# Patient Record
Sex: FEMALE | Race: WHITE | NOT HISPANIC OR LATINO | Employment: FULL TIME | URBAN - METROPOLITAN AREA
[De-identification: names, ages, dates, MRNs, and addresses within clinical notes are randomized per-mention and may not be internally consistent; named-entity substitution may affect disease eponyms.]

---

## 2017-02-06 ENCOUNTER — OFFICE VISIT (OUTPATIENT)
Dept: FAMILY MEDICINE | Facility: CLINIC | Age: 32
End: 2017-02-06
Payer: COMMERCIAL

## 2017-02-06 VITALS
OXYGEN SATURATION: 98 % | BODY MASS INDEX: 23.06 KG/M2 | SYSTOLIC BLOOD PRESSURE: 128 MMHG | DIASTOLIC BLOOD PRESSURE: 85 MMHG | TEMPERATURE: 97.5 F | HEART RATE: 75 BPM | WEIGHT: 138.4 LBS | HEIGHT: 65 IN

## 2017-02-06 DIAGNOSIS — F40.01 FEAR OF TRAVEL WITH PANIC ATTACKS: Primary | ICD-10-CM

## 2017-02-06 PROCEDURE — 99213 OFFICE O/P EST LOW 20 MIN: CPT | Performed by: NURSE PRACTITIONER

## 2017-02-06 RX ORDER — LORAZEPAM 0.5 MG/1
0.25-0.5 TABLET ORAL EVERY 8 HOURS PRN
Qty: 8 TABLET | Refills: 0 | Status: SHIPPED | OUTPATIENT
Start: 2017-02-06

## 2017-02-06 NOTE — MR AVS SNAPSHOT
"              After Visit Summary   2/6/2017    Teresa Lozano    MRN: [id number]           Patient Information     Date Of Birth          1985        Visit Information        Provider Department      2/6/2017 3:00 PM Kayli Blanco APRN CNP [facility] [place]        Today's Diagnoses     Fear of travel with panic attacks    -  1        Follow-ups after your visit        Who to contact     If you have questions or need follow up information about today's clinic visit or your schedule please contact [facility] [place] directly at [phone].  Normal or non-critical lab and imaging results will be communicated to you by [employer], letter or phone within 4 business days after the clinic has received the results. If you do not hear from us within 7 days, please contact the clinic through [employer] or phone. If you have a critical or abnormal lab result, we will notify you by phone as soon as possible.  Submit refill requests through [employer] or call your pharmacy and they will forward the refill request to us. Please allow 3 business days for your refill to be completed.          Additional Information About Your Visit        MyChart Information     [employer] gives you secure access to your electronic health record. If you see a primary care provider, you can also send messages to your care team and make appointments. If you have questions, please call your primary care clinic.  If you do not have a primary care provider, please call [phone] and they will assist you.        Care EveryWhere ID     This is your Care EveryWhere ID. This could be used by other organizations to access your [place] medical records  [id number]        Your Vitals Were     Pulse Temperature Height BMI (Body Mass Index) Pulse Oximetry       75 97.5  F (36.4  C) (Oral) 5' 5\" (1.651 m) 23.03 kg/m2 98%        Blood Pressure from Last 3 Encounters:   02/06/17 128/85   02/04/15 123/78   08/02/07 114/76    Weight from " Last 3 Encounters:   02/06/17 138 lb 6.4 oz (62.778 kg)   02/04/15 133 lb 4.8 oz (60.464 kg)   08/02/07 153 lb 11.2 oz (69.718 kg)              Today, you had the following     No orders found for display         Today's Medication Changes          These changes are accurate as of: 2/6/17  3:06 PM.  If you have any questions, ask your nurse or doctor.               Start taking these medicines.        Dose/Directions    LORazepam 0.5 MG tablet   Commonly known as:  ATIVAN   Used for:  Fear of travel with panic attacks   Started by:  Kayli Blanco APRN CNP        Dose:  0.25-0.5 mg   Take 0.5-1 tablets (0.25-0.5 mg) by mouth every 8 hours as needed for anxiety Take 30 minutes prior to departure.  Do not operate a vehicle after taking this medication   Quantity:  8 tablet   Refills:  0            Where to get your medicines      Some of these will need a paper prescription and others can be bought over the counter.  Ask your nurse if you have questions.     Bring a paper prescription for each of these medications    - LORazepam 0.5 MG tablet             Primary Care Provider Office Phone # Fax #    Tamiko Prabhakar PA-C [phone]       XXX RESIGNED XXX [phone] [facility] [address]        Thank you!     Thank you [place] [facility] [place]. Our goal is always to provide you with excellent care. Hearing back from our patients is one way we can continue to improve our services. Please take a few minutes to complete the written survey that you may receive in the mail after your visit with us. Thank you!             Your Updated Medication List - Protect others around you: Learn how to safely use, store and throw away your medicines at www.disposemymeds.org.          This list is accurate as of: 2/6/17  3:06 PM.  Always use your most recent med list.                   Brand Name Dispense Instructions for use    LORazepam 0.5 MG tablet    ATIVAN    8 tablet    Take  0.5-1 tablets (0.25-0.5 mg) by mouth every 8 hours as needed for anxiety Take 30 minutes prior to departure.  Do not operate a vehicle after taking this medication

## 2017-02-06 NOTE — PROGRESS NOTES
"  SUBJECTIVE:                                                    Teresa Lozano is a 31 year old female who presents to clinic today for the following health issues:      Concern - Flight anxiety     Onset: 5 years     Description:   When in the airplane, heart starts beating fast, sweat a little bit, feeling will come in waves and will start to feel better.     Intensity: moderate, and getting worse every time flying.    Progression of Symptoms:  worsening    Accompanying Signs & Symptoms:  More nervous.       Previous history of similar problem:   Had it for 5 years but never experienced it before that. Was able to be fly before.    Precipitating factors:   Worsened by: Taking off and throughout flight. Don't know what would cause it.    Alleviating factors:  Improved by: Nothing. Landing is when she feels more relaxed.       Therapies Tried and outcome: None. Just try to distract self by reading a book or watch movie.    Does not typically have anxiety or panic issues; only when flying.  Questions/Concerns: None      Problem list and histories reviewed & adjusted, as indicated.  Additional history: as documented    Labs reviewed in EP[facility]  Problem list, Medication list, Allergies, and Medical/Social/Surgical histories reviewed in [facility] and updated as appropriate.    ROS:  Constitutional, HEENT, cardiovascular, pulmonary, gi and gu systems are negative, except as otherwise noted.    OBJECTIVE:                                                    /85 mmHg  Pulse 75  Temp(Src) 97.5  F (36.4  C) (Oral)  Ht 5' 5\" (1.651 m)  Wt 138 lb 6.4 oz (62.778 kg)  BMI 23.03 kg/m2  SpO2 98%  Body mass index is 23.03 kg/(m^2).  GENERAL: healthy, alert and no distress  NECK: no adenopathy, no asymmetry, masses, or scars and thyroid normal to palpation  RESP: lungs clear to auscultation - no rales, rhonchi or wheezes  CV: regular rate and rhythm, normal S1 S2, no S3 or S4, no murmur, click or rub, no peripheral edema and " peripheral pulses strong  PSYCH: mentation appears normal, affect normal/bright    Diagnostic Test Results:  none      ASSESSMENT/PLAN:                                                      Problem List Items Addressed This Visit     None      Visit Diagnoses     Fear of travel with panic attacks    -  Primary     Relevant Medications     LORazepam (ATIVAN) 0.5 MG tablet        Follow-up as needed  ANGEL Haque CNP  [facility] [place]

## 2017-02-06 NOTE — NURSING NOTE
"Chief Complaint   Patient presents with     Anxiety     flight       Initial /85 mmHg  Pulse 75  Temp(Src) 97.5  F (36.4  C) (Oral)  Ht 5' 5\" (1.651 m)  Wt 138 lb 6.4 oz (62.778 kg)  BMI 23.03 kg/m2  SpO2 98% Estimated body mass index is 23.03 kg/(m^2) as calculated from the following:    Height as of this encounter: 5' 5\" (1.651 m).    Weight as of this encounter: 138 lb 6.4 oz (62.778 kg).  Medication Reconciliation: unable or not appropriate to perform       Santos Campbell MA      "

## 2018-04-08 ENCOUNTER — HEALTH MAINTENANCE LETTER (OUTPATIENT)
Age: 33
End: 2018-04-08

## 2019-11-08 ENCOUNTER — HEALTH MAINTENANCE LETTER (OUTPATIENT)
Age: 34
End: 2019-11-08

## 2020-02-23 ENCOUNTER — HEALTH MAINTENANCE LETTER (OUTPATIENT)
Age: 35
End: 2020-02-23

## 2020-05-20 ENCOUNTER — RESULTS ONLY (OUTPATIENT)
Dept: LAB | Age: 35
End: 2020-05-20

## 2020-05-22 LAB
COVID-19 SPIKE RBD ABY TITER: NORMAL
COVID-19 SPIKE RBD ABY: NEGATIVE

## 2020-12-06 ENCOUNTER — HEALTH MAINTENANCE LETTER (OUTPATIENT)
Age: 35
End: 2020-12-06

## 2021-09-25 ENCOUNTER — HEALTH MAINTENANCE LETTER (OUTPATIENT)
Age: 36
End: 2021-09-25

## 2022-01-04 ENCOUNTER — E-VISIT (OUTPATIENT)
Dept: URGENT CARE | Facility: CLINIC | Age: 37
End: 2022-01-04
Payer: COMMERCIAL

## 2022-01-04 DIAGNOSIS — J06.9 VIRAL URI: Primary | ICD-10-CM

## 2022-01-04 PROCEDURE — 99421 OL DIG E/M SVC 5-10 MIN: CPT | Performed by: EMERGENCY MEDICINE

## 2022-01-04 NOTE — PATIENT INSTRUCTIONS
Dear Teresa Lozano    After reviewing your responses, I've been able to diagnose you with?a sinus infection caused by a virus.?     Based on your responses and diagnosis, I have prescribed no prescription meds.      It is also important to stay well hydrated, get lots of rest and take over-the-counter decongestants,?tylenol?or ibuprofen if you?are able to?take those medications per your primary care provider to help relieve discomfort.?     It is important that you take?all of?your prescribed medication even if your symptoms are improving after a few doses.? Taking?all of?your medicine helps prevent the symptoms from returning.?     If your symptoms worsen, you develop severe headache, vomiting, high fever (>102), or are not improving in 7 days, please contact your primary care provider for an appointment or visit any of our convenient Walk-in Care or Urgent Care Centers to be seen which can be found on our website?here.?     Thanks again for choosing?us?as your health care partner,?   ?  Francisco Ramirez MD?

## 2022-01-15 ENCOUNTER — HEALTH MAINTENANCE LETTER (OUTPATIENT)
Age: 37
End: 2022-01-15

## 2023-01-07 ENCOUNTER — HEALTH MAINTENANCE LETTER (OUTPATIENT)
Age: 38
End: 2023-01-07

## 2023-04-22 ENCOUNTER — HEALTH MAINTENANCE LETTER (OUTPATIENT)
Age: 38
End: 2023-04-22

## 2024-06-29 ENCOUNTER — HEALTH MAINTENANCE LETTER (OUTPATIENT)
Age: 39
End: 2024-06-29